# Patient Record
Sex: MALE | Race: WHITE | NOT HISPANIC OR LATINO | URBAN - METROPOLITAN AREA
[De-identification: names, ages, dates, MRNs, and addresses within clinical notes are randomized per-mention and may not be internally consistent; named-entity substitution may affect disease eponyms.]

---

## 2017-01-11 ENCOUNTER — EMERGENCY (EMERGENCY)
Facility: HOSPITAL | Age: 62
LOS: 1 days | Discharge: HOME | End: 2017-01-11
Attending: EMERGENCY MEDICINE | Admitting: EMERGENCY MEDICINE
Payer: COMMERCIAL

## 2017-01-11 VITALS
SYSTOLIC BLOOD PRESSURE: 171 MMHG | OXYGEN SATURATION: 100 % | HEART RATE: 77 BPM | DIASTOLIC BLOOD PRESSURE: 91 MMHG | TEMPERATURE: 98 F | RESPIRATION RATE: 20 BRPM

## 2017-01-11 DIAGNOSIS — M54.2 CERVICALGIA: ICD-10-CM

## 2017-01-11 DIAGNOSIS — T58.91XA TOXIC EFFECT OF CARBON MONOXIDE FROM UNSPECIFIED SOURCE, ACCIDENTAL (UNINTENTIONAL), INITIAL ENCOUNTER: ICD-10-CM

## 2017-01-11 DIAGNOSIS — E03.9 HYPOTHYROIDISM, UNSPECIFIED: ICD-10-CM

## 2017-01-11 DIAGNOSIS — R06.02 SHORTNESS OF BREATH: ICD-10-CM

## 2017-01-11 DIAGNOSIS — R42 DIZZINESS AND GIDDINESS: ICD-10-CM

## 2017-01-11 DIAGNOSIS — R51 HEADACHE: ICD-10-CM

## 2017-01-11 LAB — COHGB MFR BLDV: 0.7 % — SIGNIFICANT CHANGE UP

## 2017-01-11 PROCEDURE — 99284 EMERGENCY DEPT VISIT MOD MDM: CPT

## 2017-01-11 NOTE — ED PROVIDER NOTE - NS ED MD SCRIBE ATTENDING SCRIBE SECTIONS
HIV/INTAKE ASSESSMENT/SCREENINGS/REVIEW OF SYSTEMS/HISTORY OF PRESENT ILLNESS/PHYSICAL EXAM/VITAL SIGNS( Pullset)/PAST MEDICAL/SURGICAL/SOCIAL HISTORY

## 2017-01-11 NOTE — ED PROVIDER NOTE - MEDICAL DECISION MAKING DETAILS
62 y/o M with PMH of hypothyroidism presents to ED reqeusting a carbon monoxide level test.  He states he has chronic "fogginess", headache, shortness of breath for over 1 year.  He was advised to place a CO2 detector in his car and home and seek ED for testing if the detectors go off.  Today the detector in his car alarmed approx 3 hours pta.  carboxyhgb level normal.  Pt discharged home.

## 2017-01-11 NOTE — ED PROVIDER NOTE - PROGRESS NOTE DETAILS
The scribe's documentation has been prepared under my direction and personally reviewed by me in its entirety. I confirm that the note above accurately reflects all work, treatment, procedures, and medical decision making performed by me.  GEE Fierro

## 2017-01-11 NOTE — ED PROVIDER NOTE - OBJECTIVE STATEMENT
61y M Pt with PMHx hypothyroidism presents to ED c/o carbon monoxide poisoning. Pt states he has had SOB, HA, and intermittent fogginess for approximately x1 year that he thought might be CO poisoning so he put CO detector in his car. 3 hours PTA, monitor went off and Pt states he felt lightheaded and had neck pain accompanying usual SOB. Symptoms continuing in ED. 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 Denies HA today, weakness, nausea, CP, and cough. Taken off medication for hypothyroidism 3 weeks ago. 61y M Pt with PMHx hypothyroidism presents to ED c/o carbon monoxide poisoning. Pt states he has had SOB, HA, and intermittent fogginess for approximately 1 year that he thought might be CO poisoning so he put CO detector in his car. 3 hours PTA today, Pt states CO monitor went off and he felt lightheaded and had neck pain accompanying usual SOB. Symptoms continuing in ED. Drives [employer]. Denies HA today, weakness, nausea, CP, and cough. Taken off medication for hypothyroidism 3 weeks ago.

## 2017-01-11 NOTE — ED ADULT TRIAGE NOTE - CHIEF COMPLAINT QUOTE
Pt states for the last year he has felt shortness of breath and headaches. His PMD and him though it could be carbon monoxide poisoning. So he placed a monitor in his car and home. His car monitor when off and he said he opened the windows immediately after.  Pt stakes neck-ache and some shortness of breath.

## 2019-10-23 NOTE — ED ADULT NURSE NOTE - NS ED NURSE DISCH DISPOSITION
Chief Complaint   Patient presents with    Immunization/Injection     Patient presents in office today for nurse visit only for flu shot. Given in left deltoid. Tolerated well. Discharged

## 2020-06-08 ENCOUNTER — TRANSCRIPTION ENCOUNTER (OUTPATIENT)
Age: 65
End: 2020-06-08

## 2020-12-05 NOTE — ED PROVIDER NOTE - CROS ED NEURO POS
PT to ED for COVID testing. Sore throat and weakness began 1.5 weeks ago.      Pilar Chavira RN  12/05/20 9710 lightheaded/HEADACHE

## 2022-05-16 PROBLEM — E03.9 HYPOTHYROIDISM, UNSPECIFIED: Chronic | Status: ACTIVE | Noted: 2017-01-13

## 2022-05-17 ENCOUNTER — TRANSCRIPTION ENCOUNTER (OUTPATIENT)
Age: 67
End: 2022-05-17

## 2022-05-17 ENCOUNTER — APPOINTMENT (OUTPATIENT)
Dept: OTOLARYNGOLOGY | Facility: CLINIC | Age: 67
End: 2022-05-17
Payer: MEDICARE

## 2022-05-17 VITALS — BODY MASS INDEX: 27.32 KG/M2 | HEIGHT: 66 IN | WEIGHT: 170 LBS

## 2022-05-17 VITALS — TEMPERATURE: 97.7 F

## 2022-05-17 DIAGNOSIS — Z86.39 PERSONAL HISTORY OF OTHER ENDOCRINE, NUTRITIONAL AND METABOLIC DISEASE: ICD-10-CM

## 2022-05-17 DIAGNOSIS — H90.3 SENSORINEURAL HEARING LOSS, BILATERAL: ICD-10-CM

## 2022-05-17 DIAGNOSIS — H61.23 IMPACTED CERUMEN, BILATERAL: ICD-10-CM

## 2022-05-17 DIAGNOSIS — Z77.22 CONTACT WITH AND (SUSPECTED) EXPOSURE TO ENVIRONMENTAL TOBACCO SMOKE (ACUTE) (CHRONIC): ICD-10-CM

## 2022-05-17 PROBLEM — Z00.00 ENCOUNTER FOR PREVENTIVE HEALTH EXAMINATION: Status: ACTIVE | Noted: 2022-05-17

## 2022-05-17 PROCEDURE — 92567 TYMPANOMETRY: CPT

## 2022-05-17 PROCEDURE — 99212 OFFICE O/P EST SF 10 MIN: CPT | Mod: 25

## 2022-05-17 PROCEDURE — 69210 REMOVE IMPACTED EAR WAX UNI: CPT

## 2022-05-17 PROCEDURE — 92557 COMPREHENSIVE HEARING TEST: CPT

## 2022-05-17 RX ORDER — EVOLOCUMAB 420 MG/3.5
420 KIT SUBCUTANEOUS
Refills: 0 | Status: ACTIVE | COMMUNITY

## 2022-05-17 NOTE — ASSESSMENT
[FreeTextEntry1] : Cerumen impaction was removed bilaterally.  There was no infection.  Audiogram showed a mild to moderate  high-frequency sensorineural hearing loss\par \par PLAN\par \par -findings and management options discussed in detail with the patient. \par -good aural hygiene\par -avoid using cotton swabs in the ears\par -wax removal drops as needed. \par -noise precautions\par -annual audiogram\par -follow up in 1 year if doing well\par -call and return earlier if any concerns.

## 2022-05-17 NOTE — CONSULT LETTER
[Dear  ___] : Dear  [unfilled], [Courtesy Letter:] : I had the pleasure of seeing your patient, [unfilled], in my office today. [Please see my note below.] : Please see my note below. [Consult Closing:] : Thank you very much for allowing me to participate in the care of this patient.  If you have any questions, please do not hesitate to contact me. [Sincerely,] : Sincerely, [FreeTextEntry3] : Haily Paz MD\par

## 2022-05-17 NOTE — HISTORY OF PRESENT ILLNESS
[de-identified] : AJITH CRUZ is a 66 year old patient here for ear pressure and mild discomfort.  He went to urgent care and was found to have cerumen impaction.  He has no tinnitus, dizziness, or otorrhea.  He was last seen a few years ago.  He has no nasal or throat symptoms

## 2022-05-18 PROBLEM — H90.3 SENSORINEURAL HEARING LOSS (SNHL) OF BOTH EARS: Status: ACTIVE | Noted: 2022-05-18

## 2022-05-19 ENCOUNTER — TRANSCRIPTION ENCOUNTER (OUTPATIENT)
Age: 67
End: 2022-05-19

## 2022-11-25 NOTE — ED ADULT NURSE NOTE - ATTEMPT TO OOB
Called [facility] as advised to find out where to send patient's DME order for a Trapeze.  Informed that [facility] does not have the Trapeze and unable to advise where to send the DME order.     Left message for Natasha, PHYSICAL THERAPY, who had requested this DME order, informing her that [facility] unable to help.   Asked for return call with information where we can send patient's DME order.     Patrica ALMEIDA, RN      November 25, 2022  9:44 AM         no

## 2023-12-04 NOTE — ED ADULT NURSE NOTE - HOW MANY DRINKS CONTAINING ALCOHOL DO YOU HAVE ON A TYPICAL DAY WHEN YOU ARE DRINKING?
Last OV: 7/6/2023  chronic   Last RX:    Next scheduled apt: 1/10/2024  AWV          Surescript requesting a refill
1 or 2

## 2024-11-14 ENCOUNTER — APPOINTMENT (OUTPATIENT)
Dept: OTOLARYNGOLOGY | Facility: CLINIC | Age: 69
End: 2024-11-14
Payer: MEDICARE

## 2024-11-14 DIAGNOSIS — K21.9 GASTRO-ESOPHAGEAL REFLUX DISEASE W/OUT ESOPHAGITIS: ICD-10-CM

## 2024-11-14 DIAGNOSIS — R05.3 CHRONIC COUGH: ICD-10-CM

## 2024-11-14 DIAGNOSIS — H61.23 IMPACTED CERUMEN, BILATERAL: ICD-10-CM

## 2024-11-14 PROCEDURE — 99213 OFFICE O/P EST LOW 20 MIN: CPT | Mod: 25

## 2024-11-14 PROCEDURE — 31575 DIAGNOSTIC LARYNGOSCOPY: CPT

## 2024-11-14 RX ORDER — AMLODIPINE BESYLATE 5 MG/1
TABLET ORAL
Refills: 0 | Status: ACTIVE | COMMUNITY

## 2024-11-14 RX ORDER — TELMISARTAN 20 MG/1
TABLET ORAL
Refills: 0 | Status: ACTIVE | COMMUNITY

## 2024-12-20 ENCOUNTER — NON-APPOINTMENT (OUTPATIENT)
Age: 69
End: 2024-12-20

## 2024-12-20 ENCOUNTER — APPOINTMENT (OUTPATIENT)
Dept: OTOLARYNGOLOGY | Facility: CLINIC | Age: 69
End: 2024-12-20
Payer: MEDICARE

## 2024-12-20 DIAGNOSIS — K21.9 GASTRO-ESOPHAGEAL REFLUX DISEASE W/OUT ESOPHAGITIS: ICD-10-CM

## 2024-12-20 DIAGNOSIS — R05.3 CHRONIC COUGH: ICD-10-CM

## 2024-12-20 DIAGNOSIS — R49.0 DYSPHONIA: ICD-10-CM

## 2024-12-20 PROCEDURE — 99213 OFFICE O/P EST LOW 20 MIN: CPT

## 2024-12-20 RX ORDER — FLUTICASONE PROPIONATE 50 UG/1
50 SPRAY, METERED NASAL DAILY
Qty: 1 | Refills: 4 | Status: ACTIVE | COMMUNITY
Start: 2024-12-20 | End: 1900-01-01

## 2024-12-23 ENCOUNTER — TRANSCRIPTION ENCOUNTER (OUTPATIENT)
Age: 69
End: 2024-12-23

## 2024-12-23 LAB — BACTERIA THROAT CULT: NORMAL

## 2024-12-23 RX ORDER — FLUTICASONE PROPIONATE 50 UG/1
50 SPRAY, METERED NASAL DAILY
Qty: 1 | Refills: 4 | Status: ACTIVE | COMMUNITY
Start: 2024-12-23 | End: 1900-01-01